# Patient Record
Sex: FEMALE | Race: WHITE | NOT HISPANIC OR LATINO | URBAN - METROPOLITAN AREA
[De-identification: names, ages, dates, MRNs, and addresses within clinical notes are randomized per-mention and may not be internally consistent; named-entity substitution may affect disease eponyms.]

---

## 2021-08-28 ENCOUNTER — TELEPHONE ENCOUNTER (OUTPATIENT)
Dept: URBAN - METROPOLITAN AREA CLINIC 13 | Facility: CLINIC | Age: 51
End: 2021-08-28

## 2021-08-29 ENCOUNTER — TELEPHONE ENCOUNTER (OUTPATIENT)
Dept: URBAN - METROPOLITAN AREA CLINIC 13 | Facility: CLINIC | Age: 51
End: 2021-08-29

## 2022-02-15 ENCOUNTER — OFFICE VISIT (OUTPATIENT)
Dept: URBAN - METROPOLITAN AREA SURGERY CENTER 27 | Facility: SURGERY CENTER | Age: 52
End: 2022-02-15
Payer: COMMERCIAL

## 2022-02-15 DIAGNOSIS — Z12.11 AVERAGE RISK FOR CRC. DUE TO PT'S CO-MORBID STATE WITH END STAGE DEMENTIA, HIGH RISK FOR ANESTHESIA (PER NEUROLOGY); INABILITY TO TAKE A BOWEL PREP....WOULD NOT ADVISE ANY COLORECTAL CANCER SCREENING INCLUDING STOOL TEST FOR FECAL BLOOD.: ICD-10-CM

## 2022-02-15 DIAGNOSIS — K57.30 ACQUIRED DIVERTICULOSIS OF COLON: ICD-10-CM

## 2022-02-15 DIAGNOSIS — K64.0 BLEEDING GRADE I HEMORRHOIDS: ICD-10-CM

## 2022-02-15 PROCEDURE — G8907 PT DOC NO EVENTS ON DISCHARG: HCPCS | Performed by: INTERNAL MEDICINE

## 2022-02-15 PROCEDURE — 45378 DIAGNOSTIC COLONOSCOPY: CPT | Performed by: INTERNAL MEDICINE

## 2024-05-15 ENCOUNTER — DASHBOARD ENCOUNTERS (OUTPATIENT)
Age: 54
End: 2024-05-15

## 2024-05-17 ENCOUNTER — LAB OUTSIDE AN ENCOUNTER (OUTPATIENT)
Dept: URBAN - METROPOLITAN AREA CLINIC 48 | Facility: CLINIC | Age: 54
End: 2024-05-17

## 2024-05-20 ENCOUNTER — OFFICE VISIT (OUTPATIENT)
Dept: URBAN - METROPOLITAN AREA CLINIC 48 | Facility: CLINIC | Age: 54
End: 2024-05-20
Payer: COMMERCIAL

## 2024-05-20 VITALS
DIASTOLIC BLOOD PRESSURE: 78 MMHG | TEMPERATURE: 98.1 F | HEIGHT: 66 IN | HEART RATE: 94 BPM | OXYGEN SATURATION: 99 % | WEIGHT: 132 LBS | BODY MASS INDEX: 21.21 KG/M2 | SYSTOLIC BLOOD PRESSURE: 129 MMHG

## 2024-05-20 DIAGNOSIS — R93.2 ABNORMAL FINDING ON IMAGING OF LIVER: ICD-10-CM

## 2024-05-20 PROCEDURE — 99214 OFFICE O/P EST MOD 30 MIN: CPT | Performed by: INTERNAL MEDICINE

## 2024-05-20 RX ORDER — ESTRADIOL 0.05 MG/D
1 PATCH TO SKIN PATCH, EXTENDED RELEASE TRANSDERMAL
Refills: 4 | Status: ACTIVE | COMMUNITY

## 2024-05-20 RX ORDER — PROGESTERONE 100 MG/1
1 CAPSULE AT BEDTIME CAPSULE ORAL ONCE A DAY
Refills: 2 | Status: ACTIVE | COMMUNITY

## 2024-05-20 RX ORDER — TRAZODONE HYDROCHLORIDE 50 MG/1
1 TABLET AT BEDTIME AS NEEDED TABLET ORAL ONCE A DAY
Refills: 1 | Status: ACTIVE | COMMUNITY

## 2024-05-20 RX ORDER — DICLOFENAC SODIUM 75 MG/1
1 TABLET AS NEEDED TABLET, DELAYED RELEASE ORAL TWICE A DAY
Refills: 0 | Status: ACTIVE | COMMUNITY

## 2024-05-28 ENCOUNTER — LAB OUTSIDE AN ENCOUNTER (OUTPATIENT)
Dept: URBAN - METROPOLITAN AREA CLINIC 44 | Facility: CLINIC | Age: 54
End: 2024-05-28

## 2024-05-29 LAB
ALBUMIN/GLOBULIN RATIO: 2.4
ALBUMIN: 4.1
ALKALINE PHOSPHATASE: 60
ALT: 17
AST: 14
BILIRUBIN, TOTAL: 0.7
BUN/CREATININE RATIO: (no result)
CALCIUM: 9.1
CARBON DIOXIDE: 29
CHLORIDE: 105
CREATININE: 0.82
EGFR: 85
FIB 4 INDEX: 0.71
GLOBULIN: 1.7
GLUCOSE: 85
PLATELET COUNT: 260
POTASSIUM: 4.4
PROTEIN, TOTAL: 5.8
SODIUM: 141
UREA NITROGEN (BUN): 25

## 2024-05-30 ENCOUNTER — WEB ENCOUNTER (OUTPATIENT)
Dept: URBAN - METROPOLITAN AREA CLINIC 46 | Facility: CLINIC | Age: 54
End: 2024-05-30

## 2024-09-18 ENCOUNTER — OFFICE VISIT (OUTPATIENT)
Dept: URBAN - METROPOLITAN AREA CLINIC 48 | Facility: CLINIC | Age: 54
End: 2024-09-18
Payer: COMMERCIAL

## 2024-09-18 VITALS
SYSTOLIC BLOOD PRESSURE: 130 MMHG | DIASTOLIC BLOOD PRESSURE: 82 MMHG | TEMPERATURE: 98.1 F | HEART RATE: 88 BPM | WEIGHT: 135 LBS | BODY MASS INDEX: 21.69 KG/M2 | HEIGHT: 66 IN

## 2024-09-18 DIAGNOSIS — R10.12 LEFT UPPER QUADRANT PAIN: ICD-10-CM

## 2024-09-18 DIAGNOSIS — R93.5 ABNORMAL ABDOMINAL MRI: ICD-10-CM

## 2024-09-18 PROCEDURE — 99214 OFFICE O/P EST MOD 30 MIN: CPT | Performed by: INTERNAL MEDICINE

## 2024-09-18 RX ORDER — PROGESTERONE 100 MG/1
1 CAPSULE AT BEDTIME CAPSULE ORAL ONCE A DAY
Refills: 2 | Status: ACTIVE | COMMUNITY

## 2024-09-18 RX ORDER — PANTOPRAZOLE SODIUM 40 MG/1
1 TABLET TABLET, DELAYED RELEASE ORAL
Qty: 90 | Refills: 3 | OUTPATIENT
Start: 2024-09-18

## 2024-09-18 RX ORDER — ESTRADIOL 0.05 MG/D
1 PATCH TO SKIN PATCH, EXTENDED RELEASE TRANSDERMAL
Refills: 4 | Status: ACTIVE | COMMUNITY

## 2024-09-18 RX ORDER — TRAZODONE HYDROCHLORIDE 50 MG/1
1 TABLET AT BEDTIME AS NEEDED TABLET ORAL ONCE A DAY
Status: ACTIVE | COMMUNITY

## 2024-09-18 NOTE — HPI-TODAY'S VISIT:
53-year-old female presents for follow-up.  Last OV 5/20/2024 for liver lesion identified on CT. She received "full-body scan" on 4/15/24, and CT chest without contrast showed 2 hypodensities in the liver measuring up to 1 cm in the right hepatic lobe inferiorly. Mild hepatomegaly. Possible small cyst versus benign hemangiomas in the liver. Follow-up MRI showed hepatic cysts with no suspicious enhancing hepatic mass.  Also showed subcentimeter bilateral renal cysts.  Subcentimeter T2 hyperintense lesion at mid aspect of pancreatic body closely approximating left hepatic lobe of liver, may represent tiny hepatic cyst, cystic pancreatic lesion, or artifactual due to motion.  No dilation of main pancreatic duct. Recommended follow-up in 1 year. Today, she states she has had intermittent LUQ pain x 2 years. The pain feels like "ribs are inflammed", and occurs only when she lays on her side in bed. Denies N/V, melena, heartburn/indigestion, loss of weight or appetite. No change in bowel habits recently. She denies NSAID use. Recent LFTs were normal, and FIB-4 did not indicate advanced fibrosis of liver.  Patient denies history of hepatitis, extrahepatic malignancy, cirrhosis. She drinks 2 EtOH beverages on the weekends. No history IVDU or blood transfusion. She states she had mononucleosis several years ago, but is otherwise healthy. Denies jaundice, edema/ascites, family hx pancreatic or liver cancer. Colonoscopy 2/2022 showed internal hemorrhoids, diverticulosis.  Recommended repeat colon in 10 years.

## 2025-02-18 ENCOUNTER — OFFICE VISIT (OUTPATIENT)
Dept: URBAN - METROPOLITAN AREA CLINIC 48 | Facility: CLINIC | Age: 55
End: 2025-02-18

## 2025-02-26 ENCOUNTER — OFFICE VISIT (OUTPATIENT)
Dept: URBAN - METROPOLITAN AREA CLINIC 48 | Facility: CLINIC | Age: 55
End: 2025-02-26
Payer: COMMERCIAL

## 2025-02-26 VITALS
SYSTOLIC BLOOD PRESSURE: 126 MMHG | WEIGHT: 136.2 LBS | HEART RATE: 77 BPM | DIASTOLIC BLOOD PRESSURE: 81 MMHG | BODY MASS INDEX: 21.89 KG/M2 | HEIGHT: 66 IN | TEMPERATURE: 99.1 F

## 2025-02-26 DIAGNOSIS — R10.12 LEFT UPPER QUADRANT PAIN: ICD-10-CM

## 2025-02-26 DIAGNOSIS — R93.5 ABNORMAL ABDOMINAL MRI: ICD-10-CM

## 2025-02-26 PROCEDURE — 99214 OFFICE O/P EST MOD 30 MIN: CPT | Performed by: INTERNAL MEDICINE

## 2025-02-26 RX ORDER — PANTOPRAZOLE SODIUM 40 MG/1
1 TABLET TABLET, DELAYED RELEASE ORAL
Qty: 90 | Refills: 3 | Status: ACTIVE | COMMUNITY
Start: 2024-09-18

## 2025-02-26 RX ORDER — SEMAGLUTIDE 0.68 MG/ML
AS DIRECTED INJECTION, SOLUTION SUBCUTANEOUS
Status: ACTIVE | COMMUNITY

## 2025-02-26 RX ORDER — HYOSCYAMINE SULFATE 0.12 MG/1
AS NEEDED FOR ABDOMINAL PAIN TABLET ORAL
Qty: 90 | Refills: 1 | OUTPATIENT
Start: 2025-02-26 | End: 2025-04-27

## 2025-02-26 RX ORDER — ESTRADIOL 0.05 MG/D
1 PATCH TO SKIN PATCH, EXTENDED RELEASE TRANSDERMAL
Refills: 4 | Status: ACTIVE | COMMUNITY

## 2025-02-26 RX ORDER — PROGESTERONE 100 MG/1
1 CAPSULE AT BEDTIME CAPSULE ORAL ONCE A DAY
Refills: 2 | Status: ACTIVE | COMMUNITY

## 2025-02-26 NOTE — HPI-TODAY'S VISIT:
54-year-old female presents for follow-up.  Last OV 9/18/2024. Patient previously received "full-body scan" on 4/15/24, and CT chest without contrast showed 2 hypodensities in the liver measuring up to 1 cm in the right hepatic lobe inferiorly; mild hepatomegaly; possible small cyst versus benign hemangiomas in the liver. Follow-up MRI showed hepatic cysts with no suspicious enhancing hepatic mass; subcentimeter T2 hyperintense lesion at mid aspect of pancreatic body closely approximating left hepatic lobe of liver, may represent tiny hepatic cyst, cystic pancreatic lesion, or artifactual due to motion; no dilation of main pancreatic duct. Recommended follow-up MRI in 1 year. At last visit, patient described intermittent LUQ pain x 2 years. She described sensation of "rib inflammation", which occured only when she lays on her side in bed, and denied other upper GI symptoms (reflux, N/V, etc.). No NSAID use. LFTs were normal, and FIB-4 did not indicate advanced fibrosis of liver. Rx'd pantoprazole 40mg daily. However, patient states PPI did not improve her LLQ pain. She believes pain may be musculoskeletal. Patient continues to deny reflux, N/V, early satiety, bloating, melena. Weight is stable. Colonoscopy 2/2022 showed internal hemorrhoids, diverticulosis; recommended repeat colon in 10 years.

## 2025-08-19 ENCOUNTER — OFFICE VISIT (OUTPATIENT)
Dept: URBAN - METROPOLITAN AREA CLINIC 48 | Facility: CLINIC | Age: 55
End: 2025-08-19

## 2025-08-19 RX ORDER — PROGESTERONE 100 MG/1
1 CAPSULE AT BEDTIME CAPSULE ORAL ONCE A DAY
Refills: 2 | Status: ACTIVE | COMMUNITY

## 2025-08-19 RX ORDER — SEMAGLUTIDE 0.68 MG/ML
AS DIRECTED INJECTION, SOLUTION SUBCUTANEOUS
Status: ACTIVE | COMMUNITY

## 2025-08-19 RX ORDER — PANTOPRAZOLE SODIUM 40 MG/1
1 TABLET TABLET, DELAYED RELEASE ORAL
Qty: 90 | Refills: 3 | Status: ACTIVE | COMMUNITY
Start: 2024-09-18

## 2025-08-19 RX ORDER — ESTRADIOL 0.05 MG/D
1 PATCH TO SKIN PATCH, EXTENDED RELEASE TRANSDERMAL
Refills: 4 | Status: ACTIVE | COMMUNITY

## 2025-08-26 ENCOUNTER — OFFICE VISIT (OUTPATIENT)
Dept: URBAN - METROPOLITAN AREA CLINIC 48 | Facility: CLINIC | Age: 55
End: 2025-08-26